# Patient Record
Sex: MALE | Race: OTHER | HISPANIC OR LATINO | Employment: FULL TIME | URBAN - METROPOLITAN AREA
[De-identification: names, ages, dates, MRNs, and addresses within clinical notes are randomized per-mention and may not be internally consistent; named-entity substitution may affect disease eponyms.]

---

## 2024-07-22 ENCOUNTER — APPOINTMENT (OUTPATIENT)
Dept: ORTHOPEDIC SURGERY | Facility: HOSPITAL | Age: 24
End: 2024-07-22

## 2024-07-23 ENCOUNTER — OFFICE VISIT (OUTPATIENT)
Dept: ORTHOPEDIC SURGERY | Facility: HOSPITAL | Age: 24
End: 2024-07-23

## 2024-07-23 DIAGNOSIS — S54.11XA INJURY OF RIGHT MEDIAN NERVE, UNSPECIFIED INJURY LOCATION, INITIAL ENCOUNTER: ICD-10-CM

## 2024-07-23 PROCEDURE — 1036F TOBACCO NON-USER: CPT | Performed by: STUDENT IN AN ORGANIZED HEALTH CARE EDUCATION/TRAINING PROGRAM

## 2024-07-23 PROCEDURE — 99211 OFF/OP EST MAY X REQ PHY/QHP: CPT | Performed by: STUDENT IN AN ORGANIZED HEALTH CARE EDUCATION/TRAINING PROGRAM

## 2024-07-23 ASSESSMENT — PAIN - FUNCTIONAL ASSESSMENT: PAIN_FUNCTIONAL_ASSESSMENT: NO/DENIES PAIN

## 2024-07-23 NOTE — PROGRESS NOTES
[facility]  Hand and Upper Extremity Service  Post Operative visit        Date of surgery: 7/03/2024  Surgery(s) performed: Cable sural nerve graft for median nerve reconstruction-right        Subjective report:   Patient has been doing well postoperatively.  He has not been any therapy.  He denies fevers or chills.  He denies any erythema, warmth or drainage from his surgical sites.  He notes numbness in the median nerve distribution.  Notes numbness in the lateral side of his foot and the sural nerve distribution.  He has been working on range of motion himself.  He notes that the swelling has significantly decreased    Exam findings; right upper extremity  Well-healed incisions with sutures intact.  There is no drainage.  There is no erythema or warmth.  Patient is able to flex and extend the wrist.  He has numbness in the median nerve distribution of his hand.  He has numbness in the medial antebrachial cutaneous distribution of his forearm.  He is able to flex the small ring and middle finger at the MP, PIP, DIP joints.  He is unable to flex the DIP of the index finger or the IP joint of the thumb.  He has no thumb opposition.  PIN and ulnar nerves are intact.  Sensations intact in the radial and ulnar nerve distributions.    Right leg  Well-healed incision with sutures intact to the right leg.  There is no erythema, warmth, drainage.  Numbness in the sural nerve distribution        Radiograph findings: None        Plan:   Sutures removed in office.  A sterile dressing was applied.  We discussed work on elbow range of motion and finger and digital range of motion I stressed the importance of passive range of motion.  He must maintain supple joints in order to have a good outcome if his nerve reconstruction is successful.  I did refer him to occupational hand therapy to maintain supple joints and to work on range of motion.  Unfortunately he does not have insurance and is  working on this.  I did place the order and did give him information and locations to try and get the therapy.  I also did give him exercises and stretches to do.  We discussed that this will take several months if this reconstruction does work.  He will follow-up in 4 weeks for repeat examination        Medications Prescribed: None           Will Beucler, DO  [employer]  Department of Orthopaedic Surgery  Hand and Upper Extremity Surgery  [facility]     Dictation performed with the use of voice recognition software. Syntax and grammatical errors may exist.

## 2024-07-29 ENCOUNTER — APPOINTMENT (OUTPATIENT)
Dept: ORTHOPEDIC SURGERY | Facility: HOSPITAL | Age: 24
End: 2024-07-29

## 2024-08-15 ENCOUNTER — HOSPITAL ENCOUNTER (OUTPATIENT)
Dept: VASCULAR MEDICINE | Facility: HOSPITAL | Age: 24
Discharge: HOME | End: 2024-08-15

## 2024-08-15 ENCOUNTER — OFFICE VISIT (OUTPATIENT)
Dept: VASCULAR SURGERY | Facility: HOSPITAL | Age: 24
End: 2024-08-15

## 2024-08-15 VITALS
HEART RATE: 77 BPM | SYSTOLIC BLOOD PRESSURE: 124 MMHG | HEIGHT: 66 IN | DIASTOLIC BLOOD PRESSURE: 75 MMHG | BODY MASS INDEX: 20.39 KG/M2 | WEIGHT: 126.9 LBS

## 2024-08-15 DIAGNOSIS — R57.8 HEMORRHAGIC SHOCK (MULTI): Primary | ICD-10-CM

## 2024-08-15 DIAGNOSIS — S51.011D LACERATION OF RIGHT ELBOW, SUBSEQUENT ENCOUNTER: ICD-10-CM

## 2024-08-15 DIAGNOSIS — M79.601 PAIN IN RIGHT ARM: ICD-10-CM

## 2024-08-15 DIAGNOSIS — S45.109A: ICD-10-CM

## 2024-08-15 PROCEDURE — 99214 OFFICE O/P EST MOD 30 MIN: CPT | Performed by: SURGERY

## 2024-08-15 PROCEDURE — 93931 UPPER EXTREMITY STUDY: CPT

## 2024-08-15 PROCEDURE — 93931 UPPER EXTREMITY STUDY: CPT | Performed by: SURGERY

## 2024-08-15 ASSESSMENT — PATIENT HEALTH QUESTIONNAIRE - PHQ9
1. LITTLE INTEREST OR PLEASURE IN DOING THINGS: NOT AT ALL
2. FEELING DOWN, DEPRESSED OR HOPELESS: NOT AT ALL
SUM OF ALL RESPONSES TO PHQ9 QUESTIONS 1 AND 2: 0

## 2024-08-15 ASSESSMENT — COLUMBIA-SUICIDE SEVERITY RATING SCALE - C-SSRS
1. IN THE PAST MONTH, HAVE YOU WISHED YOU WERE DEAD OR WISHED YOU COULD GO TO SLEEP AND NOT WAKE UP?: NO
6. HAVE YOU EVER DONE ANYTHING, STARTED TO DO ANYTHING, OR PREPARED TO DO ANYTHING TO END YOUR LIFE?: NO
2. HAVE YOU ACTUALLY HAD ANY THOUGHTS OF KILLING YOURSELF?: NO

## 2024-08-15 ASSESSMENT — PAIN SCALES - GENERAL: PAINLEVEL: 0-NO PAIN

## 2024-08-15 NOTE — PATIENT INSTRUCTIONS
It was a pleasure taking care of you today and appreciate your seeing us at our [facility].     Today's plan is as follows:  1) 6-9 TO MONTHS SEE YOU BACK FOR A VISIT AND THEN AT THE 3 YEAR NISREEN ,WE CAN RE-EVALUATE THE BYPASS AND THE BLOOD FLOW TO YOUR HAND  2) FOLLOW UP WITH ORTHOPEDICS AND CONTINUE HAND EXERCISES AS DISCUSSED  3) CONTINUE ASPIRIN 81MG DAILY      Please call the office with any questions at [phone].   You can speak to our secretaries or our clinical nurses for specific questions.   For [facility] specific questions, you can also call [phone] or email at [email or web address]  If you need coordinating your appointments and testing you can do these at the  or by calling my office shortly after your visit.

## 2024-08-15 NOTE — LETTER
August 15, 2024     Patient: Zeus Nicolas   YOB: 2000   Date of Visit: 8/15/2024       To Whom It May Concern:    Zeus Nicolas was seen in my clinic on 8/15/2024 at 2:00 pm. Please excuse Zeus for his absence from work on this day to make the appointment.    If you have any questions or concerns, please don't hesitate to call.         Sincerely,         Nicholas Hoyt MD        CC: No Recipients

## 2024-08-15 NOTE — PROGRESS NOTES
F/U REASON: FOLLOW UP AFTER RIGHT ARM REVASCULARIZATION    CURRENT ENCOUNTER:  Zeus Nicolas is 24 y.o. male here for follow up of FOLLOW UP AFTER RIGHT ARM REVASCULARIZATION.    Overall doing well.  Has follow-up with hand surgery in about a week or 2 from now.  He remains with some digital weakness in the index and thumb but continues to do exercises and improving with his .  His incision in the arm have healed.  He did have a duplex today which shows that the radial artery bypass is open and he had his native ulnar and interosseous artery uninjured at the time of surgery.    He remains on aspirin.    Meds:   Current Outpatient Medications:     aspirin 81 mg chewable tablet, Chew 1 tablet (81 mg) once daily., Disp: 90 tablet, Rfl: 0    methocarbamol (Robaxin) 500 mg tablet, Take 1 tablet (500 mg) by mouth every 6 hours if needed for muscle spasms for up to 5 days., Disp: 20 tablet, Rfl: 0    Allergies:   No Known Allergies    ROS:  Review of Systems  otherwise unremarkable    Objective:  Vitals:  Vitals:    08/15/24 1416   BP: 124/75   Pulse: 77        Exam:  No distress  Breathing comfortably   Not tachycardic  Palpable bilateral radial pulses - STRONG ULNAR > RADIAL PULSE ON RIGHT  WELL HEALED INCISIONS   HAND WARM AND PERFUSED  NO ARM EDEMA  WEAKNESS AS ABOVE IN HPI    Labs:  Lab Results   Component Value Date    WBC 5.9 07/07/2024    WBC 8.0 07/06/2024    WBC 8.7 07/05/2024    HGB 7.8 (L) 07/07/2024    HGB 7.8 (L) 07/06/2024    HGB 7.8 (L) 07/05/2024    HCT 23.3 (L) 07/07/2024    HCT 23.4 (L) 07/06/2024    HCT 23.5 (L) 07/05/2024    MCV 89 07/07/2024    MCV 90 07/06/2024    MCV 90 07/05/2024     07/07/2024     Lab Results   Component Value Date    CREATININE 0.64 07/07/2024    CREATININE 0.59 07/06/2024    CREATININE 0.60 07/05/2024    BUN 12 07/07/2024    BUN 11 07/06/2024    BUN 6 07/05/2024     07/07/2024     07/06/2024     07/05/2024    K 4.1 07/07/2024    K 3.8 07/06/2024     K 3.7 07/05/2024     07/07/2024     07/06/2024     07/05/2024    CO2 28 07/07/2024    CO2 26 07/06/2024    CO2 27 07/05/2024         Imaging:  VASCULAR DUPLEX REVIEWED AS ABOVE    Assessment & Plan:  Zeus Nicolas is 24 y.o. 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  Status post:  6/30/2024: Control of hemorrhage from right upper extremity due to traumatic mid arm wound.  Primary repair of the right brachial vein.  Interposition bypass of right radial artery with ipsilateral reversed cephalic vein.    Has palpable pulses and is well-healed.  He will continue follow-up with orthopedics hand surgery for needed care of his recovery.  I can see him back in 6 to 9 months for revisit.  I plan to reevaluate with ABIs and arterial duplex at the 1 year vicky.  He will continue aspirin 81 mg daily.    Nicholas Hoyt MD, MHS, RPVI  , [employer]  Director, [facility], [facility] [place]  Co-Director, V[facility], [facility] [place]  Division of Vascular Surgery and Endovascular Therapy  [facility]

## 2025-08-15 ENCOUNTER — HOSPITAL ENCOUNTER (EMERGENCY)
Facility: HOSPITAL | Age: 25
Discharge: HOME | End: 2025-08-16
Attending: EMERGENCY MEDICINE

## 2025-08-15 DIAGNOSIS — F10.920 ALCOHOLIC INTOXICATION WITHOUT COMPLICATION: Primary | ICD-10-CM

## 2025-08-15 PROCEDURE — 99284 EMERGENCY DEPT VISIT MOD MDM: CPT | Performed by: EMERGENCY MEDICINE

## 2025-08-15 PROCEDURE — 99284 EMERGENCY DEPT VISIT MOD MDM: CPT

## 2025-08-15 PROCEDURE — 99285 EMERGENCY DEPT VISIT HI MDM: CPT

## 2025-08-15 PROCEDURE — 99285 EMERGENCY DEPT VISIT HI MDM: CPT | Performed by: EMERGENCY MEDICINE

## 2025-08-15 ASSESSMENT — LIFESTYLE VARIABLES
EVER FELT BAD OR GUILTY ABOUT YOUR DRINKING: NO
EVER HAD A DRINK FIRST THING IN THE MORNING TO STEADY YOUR NERVES TO GET RID OF A HANGOVER: NO
HAVE PEOPLE ANNOYED YOU BY CRITICIZING YOUR DRINKING: NO
TOTAL SCORE: 0
HAVE YOU EVER FELT YOU SHOULD CUT DOWN ON YOUR DRINKING: NO

## 2025-08-15 ASSESSMENT — PAIN - FUNCTIONAL ASSESSMENT: PAIN_FUNCTIONAL_ASSESSMENT: 0-10

## 2025-08-15 ASSESSMENT — PAIN SCALES - GENERAL
PAINLEVEL_OUTOF10: 0 - NO PAIN
PAINLEVEL_OUTOF10: 0 - NO PAIN

## 2025-08-16 VITALS
HEART RATE: 84 BPM | DIASTOLIC BLOOD PRESSURE: 78 MMHG | TEMPERATURE: 97.7 F | SYSTOLIC BLOOD PRESSURE: 119 MMHG | OXYGEN SATURATION: 97 % | RESPIRATION RATE: 14 BRPM

## 2025-08-16 LAB — GLUCOSE BLD MANUAL STRIP-MCNC: 87 MG/DL (ref 74–99)

## 2025-08-16 PROCEDURE — 2500000004 HC RX 250 GENERAL PHARMACY W/ HCPCS (ALT 636 FOR OP/ED)

## 2025-08-16 PROCEDURE — 96361 HYDRATE IV INFUSION ADD-ON: CPT

## 2025-08-16 PROCEDURE — 82947 ASSAY GLUCOSE BLOOD QUANT: CPT

## 2025-08-16 PROCEDURE — 96360 HYDRATION IV INFUSION INIT: CPT

## 2025-08-16 RX ADMIN — SODIUM CHLORIDE, SODIUM LACTATE, POTASSIUM CHLORIDE, AND CALCIUM CHLORIDE 1000 ML: .6; .31; .03; .02 INJECTION, SOLUTION INTRAVENOUS at 00:17
